# Patient Record
Sex: FEMALE | Race: WHITE | Employment: OTHER | URBAN - METROPOLITAN AREA
[De-identification: names, ages, dates, MRNs, and addresses within clinical notes are randomized per-mention and may not be internally consistent; named-entity substitution may affect disease eponyms.]

---

## 2018-01-29 ENCOUNTER — APPOINTMENT (OUTPATIENT)
Dept: GENERAL RADIOLOGY | Facility: HOSPITAL | Age: 83
DRG: 292 | End: 2018-01-29
Attending: EMERGENCY MEDICINE
Payer: MEDICARE

## 2018-01-29 ENCOUNTER — APPOINTMENT (OUTPATIENT)
Dept: CT IMAGING | Facility: HOSPITAL | Age: 83
DRG: 292 | End: 2018-01-29
Attending: EMERGENCY MEDICINE
Payer: MEDICARE

## 2018-01-29 ENCOUNTER — APPOINTMENT (OUTPATIENT)
Dept: GENERAL RADIOLOGY | Facility: HOSPITAL | Age: 83
DRG: 292 | End: 2018-01-29
Attending: NURSE PRACTITIONER
Payer: MEDICARE

## 2018-01-29 ENCOUNTER — APPOINTMENT (OUTPATIENT)
Dept: CV DIAGNOSTICS | Facility: HOSPITAL | Age: 83
DRG: 292 | End: 2018-01-29
Attending: INTERNAL MEDICINE
Payer: MEDICARE

## 2018-01-29 ENCOUNTER — HOSPITAL ENCOUNTER (INPATIENT)
Facility: HOSPITAL | Age: 83
LOS: 2 days | Discharge: NURSING, REHAB OR LONG-TERM CARE | DRG: 292 | End: 2018-01-31
Attending: EMERGENCY MEDICINE | Admitting: HOSPITALIST
Payer: MEDICARE

## 2018-01-29 ENCOUNTER — APPOINTMENT (OUTPATIENT)
Dept: ULTRASOUND IMAGING | Facility: HOSPITAL | Age: 83
DRG: 292 | End: 2018-01-29
Attending: EMERGENCY MEDICINE
Payer: MEDICARE

## 2018-01-29 DIAGNOSIS — I50.9 ACUTE ON CHRONIC CONGESTIVE HEART FAILURE, UNSPECIFIED CONGESTIVE HEART FAILURE TYPE: Primary | ICD-10-CM

## 2018-01-29 PROBLEM — I48.91 A-FIB (HCC): Status: ACTIVE | Noted: 2018-01-29

## 2018-01-29 PROBLEM — D50.9 IRON DEFICIENCY ANEMIA: Status: ACTIVE | Noted: 2018-01-29

## 2018-01-29 PROBLEM — G45.9 TIA (TRANSIENT ISCHEMIC ATTACK): Status: ACTIVE | Noted: 2018-01-29

## 2018-01-29 PROBLEM — I49.5 SSS (SICK SINUS SYNDROME) (HCC): Status: ACTIVE | Noted: 2018-01-29

## 2018-01-29 PROBLEM — I25.10 CORONARY ARTERY DISEASE INVOLVING NATIVE CORONARY ARTERY: Status: ACTIVE | Noted: 2018-01-29

## 2018-01-29 PROBLEM — J45.909 ASTHMA: Status: ACTIVE | Noted: 2018-01-29

## 2018-01-29 LAB
ANION GAP SERPL CALC-SCNC: 6 MMOL/L (ref 0–18)
BASOPHILS # BLD: 0 K/UL (ref 0–0.2)
BASOPHILS NFR BLD: 1 %
BNP SERPL-MCNC: 246 PG/ML (ref 0–100)
BUN SERPL-MCNC: 18 MG/DL (ref 8–20)
BUN/CREAT SERPL: 22.8 (ref 10–20)
CALCIUM SERPL-MCNC: 10 MG/DL (ref 8.5–10.5)
CHLORIDE SERPL-SCNC: 107 MMOL/L (ref 95–110)
CO2 SERPL-SCNC: 27 MMOL/L (ref 22–32)
CREAT SERPL-MCNC: 0.79 MG/DL (ref 0.5–1.5)
EOSINOPHIL # BLD: 0 K/UL (ref 0–0.7)
EOSINOPHIL NFR BLD: 1 %
ERYTHROCYTE [DISTWIDTH] IN BLOOD BY AUTOMATED COUNT: 15.2 % (ref 11–15)
GLUCOSE SERPL-MCNC: 92 MG/DL (ref 70–99)
HCT VFR BLD AUTO: 32.7 % (ref 35–48)
HGB BLD-MCNC: 10.5 G/DL (ref 12–16)
LYMPHOCYTES # BLD: 0.6 K/UL (ref 1–4)
LYMPHOCYTES NFR BLD: 7 %
MCH RBC QN AUTO: 31.4 PG (ref 27–32)
MCHC RBC AUTO-ENTMCNC: 32.1 G/DL (ref 32–37)
MCV RBC AUTO: 97.6 FL (ref 80–100)
MONOCYTES # BLD: 0.7 K/UL (ref 0–1)
MONOCYTES NFR BLD: 8 %
MRSA DNA SPEC QL NAA+PROBE: NEGATIVE
NEUTROPHILS # BLD AUTO: 7.3 K/UL (ref 1.8–7.7)
NEUTROPHILS NFR BLD: 85 %
OSMOLALITY UR CALC.SUM OF ELEC: 292 MOSM/KG (ref 275–295)
PLATELET # BLD AUTO: 421 K/UL (ref 140–400)
PMV BLD AUTO: 7.3 FL (ref 7.4–10.3)
POTASSIUM SERPL-SCNC: 3.8 MMOL/L (ref 3.3–5.1)
RBC # BLD AUTO: 3.35 M/UL (ref 3.7–5.4)
SODIUM SERPL-SCNC: 140 MMOL/L (ref 136–144)
TROPONIN I SERPL-MCNC: 0.01 NG/ML (ref ?–0.03)
TROPONIN I SERPL-MCNC: 0.01 NG/ML (ref ?–0.03)
WBC # BLD AUTO: 8.6 K/UL (ref 4–11)

## 2018-01-29 PROCEDURE — 80048 BASIC METABOLIC PNL TOTAL CA: CPT | Performed by: EMERGENCY MEDICINE

## 2018-01-29 PROCEDURE — 93970 EXTREMITY STUDY: CPT | Performed by: EMERGENCY MEDICINE

## 2018-01-29 PROCEDURE — 93010 ELECTROCARDIOGRAM REPORT: CPT | Performed by: EMERGENCY MEDICINE

## 2018-01-29 PROCEDURE — 71045 X-RAY EXAM CHEST 1 VIEW: CPT | Performed by: EMERGENCY MEDICINE

## 2018-01-29 PROCEDURE — 87641 MR-STAPH DNA AMP PROBE: CPT | Performed by: EMERGENCY MEDICINE

## 2018-01-29 PROCEDURE — 99285 EMERGENCY DEPT VISIT HI MDM: CPT

## 2018-01-29 PROCEDURE — 73630 X-RAY EXAM OF FOOT: CPT | Performed by: NURSE PRACTITIONER

## 2018-01-29 PROCEDURE — 85025 COMPLETE CBC W/AUTO DIFF WBC: CPT | Performed by: EMERGENCY MEDICINE

## 2018-01-29 PROCEDURE — 71260 CT THORAX DX C+: CPT | Performed by: EMERGENCY MEDICINE

## 2018-01-29 PROCEDURE — A4216 STERILE WATER/SALINE, 10 ML: HCPCS

## 2018-01-29 PROCEDURE — 83880 ASSAY OF NATRIURETIC PEPTIDE: CPT | Performed by: EMERGENCY MEDICINE

## 2018-01-29 PROCEDURE — 93005 ELECTROCARDIOGRAM TRACING: CPT

## 2018-01-29 PROCEDURE — 84484 ASSAY OF TROPONIN QUANT: CPT | Performed by: EMERGENCY MEDICINE

## 2018-01-29 PROCEDURE — 96374 THER/PROPH/DIAG INJ IV PUSH: CPT

## 2018-01-29 PROCEDURE — 84484 ASSAY OF TROPONIN QUANT: CPT | Performed by: NURSE PRACTITIONER

## 2018-01-29 RX ORDER — MELATONIN
325 2 TIMES DAILY WITH MEALS
Status: DISCONTINUED | OUTPATIENT
Start: 2018-01-29 | End: 2018-01-31

## 2018-01-29 RX ORDER — SODIUM PHOSPHATE, DIBASIC AND SODIUM PHOSPHATE, MONOBASIC 7; 19 G/133ML; G/133ML
1 ENEMA RECTAL ONCE AS NEEDED
Status: DISCONTINUED | OUTPATIENT
Start: 2018-01-29 | End: 2018-01-31

## 2018-01-29 RX ORDER — PANTOPRAZOLE SODIUM 40 MG/1
40 TABLET, DELAYED RELEASE ORAL
Status: DISCONTINUED | OUTPATIENT
Start: 2018-01-30 | End: 2018-01-31

## 2018-01-29 RX ORDER — SENNOSIDES 8.6 MG
8.6 TABLET ORAL 2 TIMES DAILY
Status: DISCONTINUED | OUTPATIENT
Start: 2018-01-29 | End: 2018-01-31

## 2018-01-29 RX ORDER — SULFAMETHOXAZOLE AND TRIMETHOPRIM 800; 160 MG/1; MG/1
1 TABLET ORAL EVERY 12 HOURS SCHEDULED
Status: DISCONTINUED | OUTPATIENT
Start: 2018-01-29 | End: 2018-01-31

## 2018-01-29 RX ORDER — SODIUM CHLORIDE 0.9 % (FLUSH) 0.9 %
3 SYRINGE (ML) INJECTION AS NEEDED
Status: DISCONTINUED | OUTPATIENT
Start: 2018-01-29 | End: 2018-01-31

## 2018-01-29 RX ORDER — FUROSEMIDE 10 MG/ML
20 INJECTION INTRAMUSCULAR; INTRAVENOUS ONCE
Status: COMPLETED | OUTPATIENT
Start: 2018-01-29 | End: 2018-01-29

## 2018-01-29 RX ORDER — FUROSEMIDE 10 MG/ML
20 INJECTION INTRAMUSCULAR; INTRAVENOUS
Status: DISCONTINUED | OUTPATIENT
Start: 2018-01-29 | End: 2018-01-31

## 2018-01-29 RX ORDER — 0.9 % SODIUM CHLORIDE 0.9 %
VIAL (ML) INJECTION
Status: DISPENSED
Start: 2018-01-29 | End: 2018-01-30

## 2018-01-29 RX ORDER — TRAMADOL HYDROCHLORIDE 50 MG/1
50 TABLET ORAL EVERY 6 HOURS PRN
Status: DISCONTINUED | OUTPATIENT
Start: 2018-01-29 | End: 2018-01-31

## 2018-01-29 RX ORDER — BISACODYL 10 MG
10 SUPPOSITORY, RECTAL RECTAL
Status: DISCONTINUED | OUTPATIENT
Start: 2018-01-29 | End: 2018-01-31

## 2018-01-29 RX ORDER — DOCUSATE SODIUM 100 MG/1
100 CAPSULE, LIQUID FILLED ORAL 2 TIMES DAILY
Status: DISCONTINUED | OUTPATIENT
Start: 2018-01-29 | End: 2018-01-31

## 2018-01-29 RX ORDER — ACETAMINOPHEN 325 MG/1
650 TABLET ORAL EVERY 6 HOURS PRN
Status: DISCONTINUED | OUTPATIENT
Start: 2018-01-29 | End: 2018-01-31

## 2018-01-29 RX ORDER — METOPROLOL SUCCINATE 50 MG/1
50 TABLET, EXTENDED RELEASE ORAL
Status: DISCONTINUED | OUTPATIENT
Start: 2018-01-30 | End: 2018-01-31

## 2018-01-29 RX ORDER — POLYETHYLENE GLYCOL 3350 17 G/17G
17 POWDER, FOR SOLUTION ORAL DAILY PRN
Status: DISCONTINUED | OUTPATIENT
Start: 2018-01-29 | End: 2018-01-31

## 2018-01-29 RX ORDER — ALBUTEROL SULFATE 2.5 MG/3ML
2.5 SOLUTION RESPIRATORY (INHALATION) EVERY 6 HOURS PRN
Status: DISCONTINUED | OUTPATIENT
Start: 2018-01-29 | End: 2018-01-31

## 2018-01-29 RX ORDER — SULFAMETHOXAZOLE AND TRIMETHOPRIM 400; 80 MG/1; MG/1
1 TABLET ORAL EVERY 12 HOURS SCHEDULED
Status: DISCONTINUED | OUTPATIENT
Start: 2018-01-29 | End: 2018-01-29

## 2018-01-29 NOTE — ED INITIAL ASSESSMENT (HPI)
Pt sent here via EMS from [facility] for eval of sob. Per EMS vitals stable pta. Pt states she feels sob with activity and \"when talking\". Vitals stable on arrival to ED. Denies cp or sob at this time.

## 2018-01-29 NOTE — H&P
[facility] Hospitalist Team  History and Physical     ASSESSMENT / PLAN:   81 yo female with hx CAD S/P stents, PAF, SSS S/P PPM, HTN, elevated calcium with ?  Hx hyperparathyroidism, TIA, asthma with recent fall and femur fx S/P ORIF and Nailing Left Femur by  Hyperparathyroidism  -Ca WNL at our hospital  -follow Ca level    HH-Large  -noted on imaging  -will give PPI in case symptoms from this    [address]  -per sister Sophie Fraction pt is [phone]   -consider palliative care krystal jacques in am  -IVF,none  - phone      OBJECTIVE:  /64   Pulse 64   Temp 98.7 °F (37.1 °C) (Temporal)   Resp 20   Ht 149.9 cm (4' 11\")   Wt 115 lb (52.2 kg)   SpO2 95%   BMI 23.23 kg/m²     GENERAL: no apparent distress  NEUROLOGIC: A/A; Ox3: strength normal; sensations intact AMYLASE, LIPASE, LDH in the last 72 hours. Invalid input(s): ALPHOS, TBIL, DBIL, TPROT    Recent Labs   Lab  01/29/18   1225   TROP  0.01       Radiology: Us Venous Doppler Leg Bilat - Diag Img (cpt=93970)    Result Date: 1/29/2018  CONCLUSION:  1.  Leandrew Overall b/l toes slightly cool to touch  Psych: mood stable, cooperative  Neuro: no focal deficits    Assessment and Plan     SOB/CP-- possibly mild diastolic HF vs. Atelectasis  -no hypoxemia or tachycardia.    -echo 1/8/2018 in [facility] with EF 57%, mild-m

## 2018-01-29 NOTE — ED PROVIDER NOTES
Patient Seen in: [facility] AND [facility] Emergency Department    History   Patient presents with:  Breathing Problem    Stated Complaint: sob    HPI    68-year-old female who follows previously Narayan Rinaldi is currently [facility] with history of A. fib as well as 11\")   Wt 52.2 kg   SpO2 97%   BMI 23.23 kg/m²         Physical Exam    Constitutional: Oriented to person, place, and time. Appears well-developed. No distress. Head: Normocephalic and atraumatic.    Eyes: Conjunctivae are normal. Pupils are equal, rou ------                     CBC W/ DIFFERENTIAL[[id number]]          Abnormal            Final result                 Please view results for these tests on the individual orders.    RAINBOW DRAW BLUE   RAINBOW DRAW LAVENDER   RAINBOW DRAW DARK GREEN   RA PORTABLE (CPT=71010) TIME: 11:18 a.m. COMPARISON: None. INDICATIONS: Shortness of breath x2 days. TECHNIQUE:   Single view.    FINDINGS:  CARDIAC/VASC: Stable position of a left chest bipolar pacing device/ICD, with electrodes overlying the right atrium atelectasis and/or scar is present around the effusions. VASCULATURE: Main pulmonary artery is not enlarged. No acute pulmonary embolus to the segmental pulmonary artery level.  There is a left-sided three-vessel aortic arch without aneurysm or aortic injur failure type ([facility] I50.9 1/29/2018     A-fib ([facility] I48.91 1/29/2018 Unknown    Acute on chronic congestive heart failure ([facility] I50.9 1/29/2018 Unknown    Asthma J45.909 1/29/2018 Unknown    Coronary artery disease involving native coronary artery I25.10 1/

## 2018-01-30 ENCOUNTER — APPOINTMENT (OUTPATIENT)
Dept: CV DIAGNOSTICS | Facility: HOSPITAL | Age: 83
DRG: 292 | End: 2018-01-30
Attending: INTERNAL MEDICINE
Payer: MEDICARE

## 2018-01-30 PROBLEM — I50.9 ACUTE ON CHRONIC CONGESTIVE HEART FAILURE, UNSPECIFIED CONGESTIVE HEART FAILURE TYPE: Status: RESOLVED | Noted: 2018-01-29 | Resolved: 2018-01-30

## 2018-01-30 LAB
ANION GAP SERPL CALC-SCNC: 8 MMOL/L (ref 0–18)
BASOPHILS # BLD: 0 K/UL (ref 0–0.2)
BASOPHILS NFR BLD: 1 %
BUN SERPL-MCNC: 16 MG/DL (ref 8–20)
BUN/CREAT SERPL: 22.5 (ref 10–20)
CALCIUM SERPL-MCNC: 10.1 MG/DL (ref 8.5–10.5)
CHLORIDE SERPL-SCNC: 106 MMOL/L (ref 95–110)
CO2 SERPL-SCNC: 25 MMOL/L (ref 22–32)
CREAT SERPL-MCNC: 0.71 MG/DL (ref 0.5–1.5)
EOSINOPHIL # BLD: 0.1 K/UL (ref 0–0.7)
EOSINOPHIL NFR BLD: 1 %
ERYTHROCYTE [DISTWIDTH] IN BLOOD BY AUTOMATED COUNT: 14.6 % (ref 11–15)
GLUCOSE SERPL-MCNC: 93 MG/DL (ref 70–99)
HCT VFR BLD AUTO: 31 % (ref 35–48)
HGB BLD-MCNC: 9.9 G/DL (ref 12–16)
LYMPHOCYTES # BLD: 0.8 K/UL (ref 1–4)
LYMPHOCYTES NFR BLD: 13 %
MCH RBC QN AUTO: 31.1 PG (ref 27–32)
MCHC RBC AUTO-ENTMCNC: 32 G/DL (ref 32–37)
MCV RBC AUTO: 97.3 FL (ref 80–100)
MONOCYTES # BLD: 0.7 K/UL (ref 0–1)
MONOCYTES NFR BLD: 10 %
NEUTROPHILS # BLD AUTO: 4.7 K/UL (ref 1.8–7.7)
NEUTROPHILS NFR BLD: 75 %
OSMOLALITY UR CALC.SUM OF ELEC: 289 MOSM/KG (ref 275–295)
PLATELET # BLD AUTO: 405 K/UL (ref 140–400)
PMV BLD AUTO: 7.3 FL (ref 7.4–10.3)
POTASSIUM SERPL-SCNC: 3.6 MMOL/L (ref 3.3–5.1)
POTASSIUM SERPL-SCNC: 4.4 MMOL/L (ref 3.3–5.1)
RBC # BLD AUTO: 3.19 M/UL (ref 3.7–5.4)
SODIUM SERPL-SCNC: 139 MMOL/L (ref 136–144)
WBC # BLD AUTO: 6.3 K/UL (ref 4–11)

## 2018-01-30 PROCEDURE — 80048 BASIC METABOLIC PNL TOTAL CA: CPT | Performed by: NURSE PRACTITIONER

## 2018-01-30 PROCEDURE — 93306 TTE W/DOPPLER COMPLETE: CPT | Performed by: INTERNAL MEDICINE

## 2018-01-30 PROCEDURE — 85025 COMPLETE CBC W/AUTO DIFF WBC: CPT | Performed by: NURSE PRACTITIONER

## 2018-01-30 PROCEDURE — 84132 ASSAY OF SERUM POTASSIUM: CPT | Performed by: HOSPITALIST

## 2018-01-30 PROCEDURE — 97530 THERAPEUTIC ACTIVITIES: CPT

## 2018-01-30 PROCEDURE — 97162 PT EVAL MOD COMPLEX 30 MIN: CPT

## 2018-01-30 PROCEDURE — 97166 OT EVAL MOD COMPLEX 45 MIN: CPT

## 2018-01-30 RX ORDER — TRAMADOL HYDROCHLORIDE 50 MG/1
50 TABLET ORAL EVERY 8 HOURS PRN
COMMUNITY

## 2018-01-30 RX ORDER — ALBUTEROL SULFATE 2.5 MG/3ML
SOLUTION RESPIRATORY (INHALATION)
COMMUNITY

## 2018-01-30 RX ORDER — AMOXICILLIN 250 MG
2 CAPSULE ORAL DAILY
COMMUNITY

## 2018-01-30 RX ORDER — MELATONIN
325
COMMUNITY

## 2018-01-30 RX ORDER — FLUTICASONE PROPIONATE 50 MCG
1 SPRAY, SUSPENSION (ML) NASAL DAILY
Status: DISCONTINUED | OUTPATIENT
Start: 2018-01-30 | End: 2018-01-31

## 2018-01-30 RX ORDER — PANTOPRAZOLE SODIUM 40 MG/1
40 TABLET, DELAYED RELEASE ORAL
Qty: 30 TABLET | Refills: 0 | Status: SHIPPED | OUTPATIENT
Start: 2018-01-31

## 2018-01-30 RX ORDER — POTASSIUM CHLORIDE 20 MEQ/1
40 TABLET, EXTENDED RELEASE ORAL EVERY 4 HOURS
Status: COMPLETED | OUTPATIENT
Start: 2018-01-30 | End: 2018-01-30

## 2018-01-30 RX ORDER — ACETAMINOPHEN 325 MG/1
325 TABLET ORAL EVERY 4 HOURS PRN
COMMUNITY

## 2018-01-30 RX ORDER — METOPROLOL SUCCINATE 50 MG/1
50 TABLET, EXTENDED RELEASE ORAL DAILY
COMMUNITY

## 2018-01-30 RX ORDER — SULFAMETHOXAZOLE AND TRIMETHOPRIM 800; 160 MG/1; MG/1
1 TABLET ORAL 2 TIMES DAILY
COMMUNITY
End: 2018-02-06 | Stop reason: ALTCHOICE

## 2018-01-30 NOTE — PROGRESS NOTES
[facility] Hospitalist Team  Progress Note    Caro Rasheed Patient Status:  Inpatient    1921 MRN [id number]   Location [facility] 5SW/SE Attending Laura Espino, DO   Hosp Day # 1 PCP None Pcp     CC: Follow Up  PCP: None Pcp    ASSESSMENT/PLAN: osteopenia, degenerative changes throughout the right foot\"- will get official read  -continue bactrim, started 1/27 at rehab center     S/P ORIF Left Femur with Nailing  -surgery done 1/8 at [facility] with Dr Robert Johnson  -admit hgb 10.5-->9.9, continue iron labored, CTA anteriorly  CARDIO: Regular,   ABD: soft, NT, ND, BS+  EXTREMITIES: improved right toe redness, no warmth, improved b/L LE swelling, severe ecchymosis on left leg, no calf tenderness,    DIAGNOSTIC DATA:   Labs:     Recent Labs   Lab  01/29/18 Intravenous BID (Diuretic)   Pantoprazole Sodium (PROTONIX) EC tab 40 mg 40 mg Oral QAM AC   Sulfamethoxazole-TMP DS (BACTRIM DS) 800-160 MG per tab 1 tablet 1 tablet Oral 2 times per day         IMAGING     Us Venous Doppler Leg Bilat - Diag Img (cpt=9347 extremity edema  Skin: b/l toes cool to touch, erythema distal R foot improved  Psych: mood stable, cooperative  Neuro: no focal deficits    Assessment and Plan    SOB/CP  -no hypoxemia or tachycardia.    -echo 1/8/2018 in [facility] with EF 57%, mild- cards  -continue toprol. No ASA with eliquis. No statin with age.   -as per cards     Hx TIA  -no residual     Elevated Calcium ?  Hx Hyperparathyroidism  -Ca WNL at our hospital  -follow Ca level     HH-Large  -noted on imaging  -will give PPI in case symp

## 2018-01-30 NOTE — PHYSICAL THERAPY NOTE
PT order received, chart reviewed and RN Roseline Cranker approved PT session. PT evaluation initiate but then transport arrived to take patient for ECHO so unable to complete PT evaluation. Will return in PM after testing to resume PT evaluation as schedule permits.

## 2018-01-30 NOTE — PROGRESS NOTES
[facility] Cardiology  Progress Note    Aj Sammy Patient Status:  Inpatient    1921 MRN [id number]   Location [facility] 5SW/SE Attending Heather Trinidad, DO   Hosp Day # 1 PCP None Pcp     Impression:  1.   Dyspnea, chest pain, (MIRALAX) powder packet 17 g 17 g Oral Daily PRN   magnesium hydroxide (MILK OF MAGNESIA) 400 MG/5ML suspension 30 mL 30 mL Oral Daily PRN   bisacodyl (DULCOLAX) rectal suppository 10 mg 10 mg Rectal Daily PRN   FLEET ENEMA (FLEET) 7-19 GM/118ML enema 133

## 2018-01-30 NOTE — CONSULTS
[facility]    PATIENT'S NAME: Dolores Blank   ATTENDING PHYSICIAN: Danilo Melo DO   CONSULTING PHYSICIAN: Robson Frazier MD   PATIENT ACCOUNT#:   [id number]    LOCATION:  [address] [place] [address] [address] #:   [id number]       DATE OF BIRTH: vision. She is hard of hearing. There is chest discomfort and shortness of breath. She denies any abdominal pain, melena, or hematuria. There is a large ecchymosis back of her left leg. She has had a recent femur fracture.   There is no history of diab discomfort, atypical.  3.   Acute on chronic probable diastolic dysfunction heart failure. 4.   Coronary artery disease status post stents, with chest pain that is atypical for coronary ischemia.   5.   Paroxysmal atrial fibrillation, currently atrial pace

## 2018-01-30 NOTE — DISCHARGE SUMMARY
[facility] Hospitalist Discharge Summary   Patient ID:  Sunny Moeller  [id number]  61 year old  4/1/1921    Admit date: 1/29/2018  Discharge date: 1/31/2018    Primary Care Physician: outside hospital  Attending Physician: Fernanda Escobedo DO   Consults:   Consult abdominal pain , N/V. Last BM 2 days ago. She has hip pain 3/10.  She has been having pain in her right foot especially at the right great toe nail bed.      Family friend at [facility] was able to provide some hs as well as sister [facility] via [facility] Course going to fall off from trauma.  Toes appear cool  -Xray foot-Age-indeterminate deformity of the distal phalanx of the great toe with adjacent soft tissue swelling, severe osteopenia, degenerative changes through right foot  -continue bactrim, started at [facility] 01/31/2018   CO2 25 01/31/2018   GLU 93 01/31/2018   CA 10.4 01/31/2018     Xr Foot, Complete (min 3 Views), Right (cpt=73630)    Result Date: 1/29/2018  CONCLUSION:   Age-indeterminate deformity of the distal phalanx of the great toe with adjacent soft ti MG/3ML) 0.083% Nebu  Commonly known as:  VENTOLIN     apixaban 2.5 MG Tabs  Commonly known as:  ELIQUIS     DAILY MULTIPLE VITAMINS/MIN Tabs     ferrous sulfate 325 (65 FE) MG Tbec     Metoprolol Succinate ER 50 MG Tb24  Commonly known as:   Toprol XL     S

## 2018-01-30 NOTE — PROGRESS NOTES
Bactrim (TMP/SMX) 400/80 mg PO q12h was ordered for Fam Little by Nilton Bird, KIP    Body mass index is 23.23 kg/m².   Wt Readings from Last 6 Encounters:  01/29/18 : 115 lb      Estimated Creatinine Clearance: 34.3 mL/min (based on SCr of 0.79 mg/dL

## 2018-01-30 NOTE — PLAN OF CARE
Problem: Patient/Family Goals  Goal: Patient/Family Long Term Goal  Patient's Long Term Goal: return to [facility]    Interventions:  - lasix and monitoring.   - See additional Care Plan goals for specific interventions   Outcome: Progressing    Goal: Patie

## 2018-01-30 NOTE — PLAN OF CARE
Problem: CARDIOVASCULAR - ADULT  Goal: Maintains optimal cardiac output and hemodynamic stability  INTERVENTIONS:  - Monitor vital signs, rhythm, and trends  - Monitor for bleeding, hypotension and signs of decreased cardiac output  - Evaluate effectivenes with patient/family  Outcome: Progressing  Seen by PT 2 max assist  Goal: Maintain proper alignment of affected body part  INTERVENTIONS:  - Support and protect limb and body alignment per provider's orders  - Instruct and reinforce with patient and family

## 2018-01-30 NOTE — OCCUPATIONAL THERAPY NOTE
OCCUPATIONAL THERAPY EVALUATION - INPATIENT     Room Number: 570/570-A  Evaluation Date: 1/30/2018  Type of Evaluation: Initial  Presenting Problem:  (C/O sob)    Physician Order: IP Consult to Occupational Therapy  Reason for Therapy: ADL/IADL Dysfunction [facility])    • Essential hypertension    • Hypercholesteremia    • Pacemaker    • Sick sinus syndrome (HCC)    • TIA (transient ischemic attack)    • Venous insufficiency        Past Surgical History  Past Surgical History:  No date: [address] [facility] Impairment: 53.32%  Standardized Score (AM-PAC Scale): 35.96  CMS Modifier (G-Code): CK    FUNCTIONAL TRANSFER ASSESSMENT  Supine to Sit : Moderate assistance  Sit to Stand: Maximum assistance  Toilet Transfer: max A  Shower Transfer: NT  Chair Transfer: m

## 2018-01-30 NOTE — CM/SW NOTE
SW was informed that pt is from [facility]    SW contacted Elodia Galloway, who is pt's caregiver, who confirmed that pt is from [facility] for rehab. Pt has been there for about 3 weeks per caregiver. Elodia Galloway did state the plan is for pt to return back to [facility] once stable.     Fern Bauer

## 2018-01-31 VITALS
OXYGEN SATURATION: 97 % | HEART RATE: 62 BPM | SYSTOLIC BLOOD PRESSURE: 106 MMHG | WEIGHT: 106.69 LBS | DIASTOLIC BLOOD PRESSURE: 46 MMHG | RESPIRATION RATE: 18 BRPM | TEMPERATURE: 98 F | HEIGHT: 59 IN | BODY MASS INDEX: 21.51 KG/M2

## 2018-01-31 LAB
ANION GAP SERPL CALC-SCNC: 6 MMOL/L (ref 0–18)
BASOPHILS # BLD: 0 K/UL (ref 0–0.2)
BASOPHILS NFR BLD: 0 %
BUN SERPL-MCNC: 15 MG/DL (ref 8–20)
BUN/CREAT SERPL: 22.1 (ref 10–20)
CALCIUM SERPL-MCNC: 10.4 MG/DL (ref 8.5–10.5)
CHLORIDE SERPL-SCNC: 107 MMOL/L (ref 95–110)
CO2 SERPL-SCNC: 25 MMOL/L (ref 22–32)
CREAT SERPL-MCNC: 0.68 MG/DL (ref 0.5–1.5)
EOSINOPHIL # BLD: 0.1 K/UL (ref 0–0.7)
EOSINOPHIL NFR BLD: 2 %
ERYTHROCYTE [DISTWIDTH] IN BLOOD BY AUTOMATED COUNT: 14.8 % (ref 11–15)
GLUCOSE SERPL-MCNC: 93 MG/DL (ref 70–99)
HCT VFR BLD AUTO: 31 % (ref 35–48)
HGB BLD-MCNC: 10.3 G/DL (ref 12–16)
LYMPHOCYTES # BLD: 0.8 K/UL (ref 1–4)
LYMPHOCYTES NFR BLD: 13 %
MCH RBC QN AUTO: 31.5 PG (ref 27–32)
MCHC RBC AUTO-ENTMCNC: 33.2 G/DL (ref 32–37)
MCV RBC AUTO: 95.1 FL (ref 80–100)
MONOCYTES # BLD: 0.6 K/UL (ref 0–1)
MONOCYTES NFR BLD: 10 %
NEUTROPHILS # BLD AUTO: 4.6 K/UL (ref 1.8–7.7)
NEUTROPHILS NFR BLD: 75 %
OSMOLALITY UR CALC.SUM OF ELEC: 287 MOSM/KG (ref 275–295)
PLATELET # BLD AUTO: 392 K/UL (ref 140–400)
PMV BLD AUTO: 7.1 FL (ref 7.4–10.3)
POTASSIUM SERPL-SCNC: 4.9 MMOL/L (ref 3.3–5.1)
RBC # BLD AUTO: 3.26 M/UL (ref 3.7–5.4)
SODIUM SERPL-SCNC: 138 MMOL/L (ref 136–144)
WBC # BLD AUTO: 6.1 K/UL (ref 4–11)

## 2018-01-31 PROCEDURE — 85025 COMPLETE CBC W/AUTO DIFF WBC: CPT | Performed by: NURSE PRACTITIONER

## 2018-01-31 PROCEDURE — 80048 BASIC METABOLIC PNL TOTAL CA: CPT | Performed by: NURSE PRACTITIONER

## 2018-01-31 PROCEDURE — 97110 THERAPEUTIC EXERCISES: CPT

## 2018-01-31 RX ORDER — FUROSEMIDE 20 MG/1
TABLET ORAL
Qty: 15 TABLET | Refills: 0 | Status: SHIPPED | OUTPATIENT
Start: 2018-01-31

## 2018-01-31 RX ORDER — GUAIFENESIN 600 MG
600 TABLET, EXTENDED RELEASE 12 HR ORAL 2 TIMES DAILY PRN
Qty: 1 TABLET | Refills: 0 | Status: SHIPPED | OUTPATIENT
Start: 2018-01-31

## 2018-01-31 RX ORDER — FUROSEMIDE 20 MG/1
20 TABLET ORAL DAILY
Status: DISCONTINUED | OUTPATIENT
Start: 2018-01-31 | End: 2018-01-31

## 2018-01-31 NOTE — PROGRESS NOTES
[facility] Cardiology  Progress Note    Alvera Shade Patient Status:  Inpatient    1921 MRN [id number]   Location [facility] 5SW/SE Attending Fernanda Escobedo, DO   Hosp Day # 2 PCP None Pcp     Impression:  1.   Dyspnea, chest pain, docusate sodium (COLACE) cap 100 mg 100 mg Oral BID   PEG 3350 (MIRALAX) powder packet 17 g 17 g Oral Daily PRN   magnesium hydroxide (MILK OF MAGNESIA) 400 MG/5ML suspension 30 mL 30 mL Oral Daily PRN   bisacodyl (DULCOLAX) rectal suppository 10 mg 10 m was 55-60%. Wall motion was normal; there     were no regional wall motion abnormalities. 2. Aortic valve: There was mild stenosis. Trivial regurgitation. Mean gradient:     10mm Hg (S). Valve area: 1.13cm^2(VTI). Valve area: 1.16cm^2 (Vmax).   3. Left atr

## 2018-01-31 NOTE — PHYSICAL THERAPY NOTE
PHYSICAL THERAPY TREATMENT NOTE - INPATIENT    Room Number: 570/570-A       Presenting Problem: SOB, R foot pain, BLE edema.  Was at rehab for ORIF L femur but brought to ER with SOB    Problem List  Active Problems:    Acute on chronic congestive heart fa Standing: Poor  Dynamic Standing: Poor -    ACTIVITY TOLERANCE  Room air    AM-PAC '6-Clicks' INPATIENT SHORT FORM - BASIC MOBILITY  How much difficulty does the patient currently have. ..  -   Turning over in bed (including adjusting bedclothes, sheets and #4   Current Status     Goal #5 Patient to demonstrate active participation with home activity/exercise instructions provided to patient in preparation for discharge.    Goal #5   Current Status  working towards

## 2018-01-31 NOTE — CM/SW NOTE
SW was informed that pt is able to d/c back to [facility] today    SW notified [facility]; agreeable w/ 12p d/c time. Clinical updates sent. RIKY notified RN; agreeable w/ d/c time  SW attempted to meet w/ pt - pt was asleep. SW left IM letter in room.   SW notified pt's

## 2018-01-31 NOTE — PLAN OF CARE
CARDIOVASCULAR - ADULT    • Maintains optimal cardiac output and hemodynamic stability Completed        MUSCULOSKELETAL - ADULT    • Return mobility to safest level of function Completed    • Maintain proper alignment of affected body part Completed

## 2018-02-04 NOTE — PROGRESS NOTES
[address] [facility] Patient Status:  Outpatient    1921 MRN [id number]   Location [address] None Pcp         Eddie Doris is a 80year old female who presents to clinic for acute on chronic benito 01/29/2018 03:27 PM       Clinical labs drawn at Rehab facility, BMP, CBC.  Results reviewed    BP (!) 89/48   Pulse 62   SpO2 98%     Hospital D/C wt: 106  Clinic weights: 1) W/C bound  Home Wt:  Rehab wt: 105    General appearance: alert, appears stated a auscultation. Trace -1+ LE edema to left leg, trace LE edema to right. Patient to follow up with her PCP at [facility] and Cardiologist, Dr. Hermelinda Lr.     Blood work from 2/3 BMP stable- BUN/Cr 26/0.9 , K 5.1, CBC 2/1 WBC 7.9, Hg 10.4  Slightly hypoten (not homemade), some cereal, vegetable juice, canned vegetables, lunch meats, processed meats like hotdogs, sausage, santos, pepperoni, soy sauce, pre-packaged rice or potatoes. Please remember to read nutrition labels for sodium content.      · Limit caffei

## 2018-02-06 ENCOUNTER — OFFICE VISIT (OUTPATIENT)
Dept: CARDIOLOGY CLINIC | Facility: HOSPITAL | Age: 83
End: 2018-02-06
Attending: INTERNAL MEDICINE
Payer: MEDICARE

## 2018-02-06 VITALS — SYSTOLIC BLOOD PRESSURE: 89 MMHG | DIASTOLIC BLOOD PRESSURE: 48 MMHG | HEART RATE: 62 BPM | OXYGEN SATURATION: 98 %

## 2018-02-06 DIAGNOSIS — I50.33 ACUTE ON CHRONIC DIASTOLIC CONGESTIVE HEART FAILURE (HCC): Primary | ICD-10-CM

## 2018-02-06 PROCEDURE — 36415 COLL VENOUS BLD VENIPUNCTURE: CPT | Performed by: NURSE PRACTITIONER

## 2018-02-06 PROCEDURE — 99214 OFFICE O/P EST MOD 30 MIN: CPT | Performed by: NURSE PRACTITIONER

## 2018-02-06 PROCEDURE — 99211 OFF/OP EST MAY X REQ PHY/QHP: CPT | Performed by: NURSE PRACTITIONER

## 2018-02-06 NOTE — PATIENT INSTRUCTIONS
Increase Furosemide to 20 mg daily until Sunday, then return to Furosemide 20 mg Monday, Wednesday and Friday    Return to [address] February 20, 2018    Please call the heart failure clinic if you notice a weight gain of 3 pounds overnight or 5 fatigue.  Stop exercise if you develop chest pain, lightheadedness, or significant shortness of breath

## 2018-02-22 ENCOUNTER — LAB REQUISITION (OUTPATIENT)
Dept: LAB | Facility: HOSPITAL | Age: 83
End: 2018-02-22
Payer: MEDICARE

## 2018-02-22 DIAGNOSIS — S72.145D CLOSED NONDISPLACED INTERTROCHANTERIC FRACTURE OF LEFT FEMUR WITH ROUTINE HEALING: ICD-10-CM

## 2018-02-22 DIAGNOSIS — I48.91 ATRIAL FIBRILLATION (HCC): ICD-10-CM

## 2018-02-22 LAB
ALBUMIN SERPL BCP-MCNC: 3 G/DL (ref 3.5–4.8)
ALBUMIN/GLOB SERPL: 0.9 {RATIO} (ref 1–2)
ALP SERPL-CCNC: 108 U/L (ref 32–100)
ALT SERPL-CCNC: 12 U/L (ref 14–54)
ANION GAP SERPL CALC-SCNC: 9 MMOL/L (ref 0–18)
AST SERPL-CCNC: 20 U/L (ref 15–41)
BILIRUB SERPL-MCNC: 0.7 MG/DL (ref 0.3–1.2)
BUN SERPL-MCNC: 17 MG/DL (ref 8–20)
BUN/CREAT SERPL: 28.8 (ref 10–20)
CALCIUM SERPL-MCNC: 11 MG/DL (ref 8.5–10.5)
CHLORIDE SERPL-SCNC: 107 MMOL/L (ref 95–110)
CO2 SERPL-SCNC: 26 MMOL/L (ref 22–32)
CREAT SERPL-MCNC: 0.59 MG/DL (ref 0.5–1.5)
ERYTHROCYTE [DISTWIDTH] IN BLOOD BY AUTOMATED COUNT: 14.7 % (ref 11–15)
GLOBULIN PLAS-MCNC: 3.4 G/DL (ref 2.5–3.7)
GLUCOSE SERPL-MCNC: 95 MG/DL (ref 70–99)
HCT VFR BLD AUTO: 36 % (ref 35–48)
HGB BLD-MCNC: 11.9 G/DL (ref 12–16)
MCH RBC QN AUTO: 31.1 PG (ref 27–32)
MCHC RBC AUTO-ENTMCNC: 33.1 G/DL (ref 32–37)
MCV RBC AUTO: 94 FL (ref 80–100)
OSMOLALITY UR CALC.SUM OF ELEC: 295 MOSM/KG (ref 275–295)
PLATELET # BLD AUTO: 359 K/UL (ref 140–400)
PMV BLD AUTO: 7.7 FL (ref 7.4–10.3)
POTASSIUM SERPL-SCNC: 3.4 MMOL/L (ref 3.3–5.1)
PROT SERPL-MCNC: 6.4 G/DL (ref 5.9–8.4)
RBC # BLD AUTO: 3.83 M/UL (ref 3.7–5.4)
SODIUM SERPL-SCNC: 142 MMOL/L (ref 136–144)
WBC # BLD AUTO: 8.7 K/UL (ref 4–11)

## 2018-02-22 PROCEDURE — 80053 COMPREHEN METABOLIC PANEL: CPT | Performed by: INTERNAL MEDICINE

## 2018-02-22 PROCEDURE — 85027 COMPLETE CBC AUTOMATED: CPT | Performed by: INTERNAL MEDICINE

## 2021-06-16 NOTE — PHYSICAL THERAPY NOTE
Cardiac Catheterization Appropriate Use    [facility] Clinical Frailty Scale     [] 1. Very Fit  [] 2. Well  [] 3. Managing Well  [x] 4. Vulnerable  [] 5. Mildly Frail  [] 6. Moderately Frail [] 7. Severely Frail  [] 8. Very Severely Frail  [] 9. Terminally III        Heart Failure Yes: NYHA Class III: Significant HF symptoms/activity intolerance (Only able to do light housework, can walk slowly on level ground)         If yes, newly diagnosed? [] Yes       [x] No  (if yes, specify type)         If yes, heart failure type? [] Systolic [x] Diastolic   [] Unknown         Stress Test Performed [] Yes       [x] No  (if yes, specify type, result, and risk of ischemia)    Type Result Risk of Ischemia   [] Exercise EKG (No Imaging)  [] Stress Echo  [] Stress Nuclear   [] Stress CMR    [] Positive                   [] Negative  [] Indeterminate  [] Unavailable [] High   [] Intermediate  [] Low  [] Unavailable        Cardiac CTA Performed        Cardiac CTA Findings:    Indication(s) for Cath Lab Visit  [] Yes   [x] No  (if yes, specify findings)  [] 3VD  [] 2VD   [] 1VD   [] No Disease        [] Indeterminant      [x] ACS <24 hours    [] ACS >24 hours  [x] New Onset (Unstable) Angina       (<2 Months)  [x] Worsening (Unstable) Angina  [] Resuscitated Cardiac Arrest [x] Known CAD  [] Suspected CAD  [] Valvular Disease  [] Pericardial Disease  [] Pre-Operative Evaluation  [] Syncope [] Post-Cardiac Transplant  [] Cardiac Arrhythmias   [x] Cardiomyopathy  [x] LV dysfunction  [] Evaluation for Exercise Clearance  [] Other (specify)     Chest Pain Symptoms     [x] Typical Angina   (or Anginal Equivalent)                  [] Atypical Angina  [] Non-Anginal Chest Pain   [] None/Asymptomatic      Cardiovascular Instability   [] Persistent Ischemic Symptoms       (Angina or Anginal Equivalent)  [] Hemodynamic Instability       (Not Cardiogenic Shock)    [] Yes   [x] No     (if yes, specify below)  [] Cardiogenic Shock  [] Refractory  PHYSICAL THERAPY EVALUATION - INPATIENT     Room Number: 570/570-A  Evaluation Date: 1/30/2018  Type of Evaluation: Initial   Physician Order: PT Eval and Treat    Presenting Problem: SOB, R foot pain, BLE edema.  Was at rehab for ORIF L femur but angie Cardiogenic Shock     [] Acute Heart Failure Symptoms  [] Ventricular Arrhythmia   Evaluation for Surgery       Type of Surgery:              Functional Capacity: [] Yes   [x] No  (if yes, specify below)  [] Cardiac Surgery    [] Unknown  [] <4 METs     [] Non-Cardiac Surgery    [] >=4 METs with symptoms    [] >=4 METs without symptoms           Surgical Risk: [] Low  [] Intermediate [] High Risk Vascular  [] High Risk Non-Vascular          Solid Organ Transplant?            If Yes, Donor?                          If Yes, Organ? [] Yes  [] No  (if yes, specify if donor)  [] Yes  [] No  (if yes, specify organ)    [] Heart  [] Kidney  [] Liver        [] Lung  [] Pancreas  [] Other (specify)        Problem List  Active Problems:    Acute on chronic congestive heart failure (HCC)    A-fib (HCC)    SSS (sick sinus syndrome) (HCC)    Coronary artery disease involving native coronary artery    Iron deficiency anemia    TIA (transient ischemic attack) extremity strength is decreased. She is very weak.      BALANCE  Static Sitting: Fair  Dynamic Sitting: Fair -  Static Standing: Poor  Dynamic Standing: Poor -    ADDITIONAL TESTS                                    NEUROLOGICAL FINDINGS MIN A     Goal #1   Current Status    Goal #2 Patient is able to demonstrate transfers MOD A from chair and bed into RW. WBAT LLE.       Goal #2  Current Status    Goal #3 Patient is able to ambulate 10[' with RW WBAT LLE with MIN A   Goal #3   Current Stat

## (undated) NOTE — IP AVS SNAPSHOT
[facility]            (For Outpatient Use Only) Initial Admit Date: 1/29/2018   Inpt/Obs Admit Date: Inpt: 1/29/18 / Obs: N/A   Discharge Date:    Smitha Echevarria:  [de-identified]   MRN: [de-identified]   CSN: [id number]        ENCOUNTER  Patient Class Subscriber Name:  Miguel Zeina :    Subscriber ID:  Pt Rel to Subscriber:    Hospital Account Financial Class: Medicare    2018

## (undated) NOTE — IP AVS SNAPSHOT
Patient Demographics     Address  [address] [place] [address] Phone  [phone] (Home) *Preferred*      Emergency Contact(s)     Name Relation Home Work [facility]   [phone]    [facility]   [phone]    [facility] furosemide 20 MG Tabs  Commonly known as:  LASIX  Next dose due:  Friday 2/2/18      Every Monday, Wednesday and Friday   Tammy Crawley NP         GuaiFENesin  MG Tb12  Commonly known as:  MUCINEX  Next dose due:  As needed      Take 1 tablet (600 mg [id number] Senna (SENOKOT) tab TABS 8.6 mg 01/30/18 2030 Given      [id number] Senna (SENOKOT) tab TABS 8.6 mg 01/31/18 1030 Given      [id number] Sulfamethoxazole-TMP DS (BACTRIM DS) 800-160 MG per tab 1 tablet 01/30/18 2030 Given      [id number] Sulfamethox BUN/CREA Ratio 22.1 10.0 - 20.0 [facility]   Anion Gap 6 0 - 18 mmol/L — [facility]   Calculated Osmolality 287 275 - 295 mOsm/kg — [facility]   GFR, Non-African American >60 >=60 — [facility]   GFR, African-American >60 >=60 [facility] 01/29/18 1350    Order Status:  Completed Lab Status:  Final result Updated:  01/29/18 1504    Specimen:  Other from [place]      MRSA Screen By PCR Negative         H&P - H&P Note      H&P signed by Nathalia Ocampo NP at 1/29/2018  3:44 PM  Version 4 of 4 -surgery done 1/8[GB. 1] at [facility] with Dr Lipscomb[GB.2]  -[GB. 1]admit hgb 10.5,[GB. 2] continue iron for post op anemia  -prn tramadol  -DVT prophylaxis- eliquis  -PT/OT/SW-wt bearing as tolerated  -follow up with Dr Lito Verma  -prn albuterol    P [facility] on 1/8, post op coarse complicated by anemia and NSVT. She has since been doing rehab at [facility]. She has noted SOB with exertion, some mild chest pain. She has had LE swelling.  She was recently started on bactrim for possible cellulitis of righ No outpatient prescriptions have been marked as taking for the 1/29/18 encounter [facility] HOSPITAL Encounter). Soc Hx     Smoking status: Former Smoker     Smokeless tobacco: Never Used    Alcohol use Not on file        Fam Hx  No family history on file.     Rev mornings. Denies similar symptoms in the past, no other complaints noted, no fever/chills or other URI type symptoms. objective[LA. 1]  /51   Pulse 64   Temp 98.7 °F (37.1 °C) (Temporal)   Resp 20   Ht 149.9 cm (4' 11\")   Wt 115 lb (52.2 kg)   Sp -EKG with SR. Tele monitoring  -continue toprol. No ASA with eliquis. No statin with age.   -saw Dr Roxanna Brantley at [facility]  -as per cards     Hx TIA  -no residual     Elevated Calcium ?  Hx Hyperparathyroidism  -Ca WNL at our hospital  -follow Ca level    -CXR with small bibasilar pleural effusions with B/L pul opacities suggesting atelectasis[GB.1].[GB.2]   -US legs negative for DVT. CT chest R/O PE pending  -lasix 20 mg IV BID[GB.1]. Follow daily wts, I/O and Cr. PAWEL[GB.2]  -continue toprol.  No ASA calcium with ? Hx hyperparathyroidism, TIA, asthma with recent fall and femur fx S/P ORIF and Nailing Left Femur by Dr Otoniel Greer with hospital coarse complicated by post op anemia.  Pt has been doing rehab at [facility] and now presents with SOB with exertion, • A-fib [facility])    • Anemia    • Arrhythmia    • Asthma    • Cardiomyopathy [facility])    • Essential hypertension    • Hypercholesteremia    • Pacemaker    • Sick sinus syndrome (HCC)    • TIA (transient ischemic attack)    • Venous insufficiency         PSH  Hi HPI : Pt is a 79 yo F with a history of CAD s/p stents, PAF, SSS s/p PPM,   HTN, hypoparathyroidism, TIA, asthma, recent fall with femur fracture s/p ORIF by Dr. Otoniel Greer at [facility] who presents from [address] with a c/co of sob on exertion.  Pt states she -admit hgb 10.5, continue iron for post op anemia  -prn tramadol  -DVT prophylaxis- eliquis  -PT/OT/SW-wt bearing as tolerated  -follow up with Dr Veronika Hastings     Asthma  -prn albuterol     PAF/SSS S/P PPM/NSVT/CAD S/P Stents  -echo 1/8/2018 in [facility] with recent dx of cellulitis, see below for details    SOB/CP  -no hypoxemia or tachycardia. -echo 1/8/2018 in [facility] with EF 57%, mild-mod LVH, mild-moderate pul HTN with RSVP 45.   -troponin negative-->repeat pending.  EKG with noted old infarc Pager [phone]  Answering Service number: [phone]  1/29/2018      HISTORY:   CC: Patient presents with:  Breathing Problem       PCP: None Pcp-aide CHAVARRIA    History of Present Illness:    81 yo female with hx CAD S/P stents, PAF, SSS S/P PPM, HTN EXTREMITIES: left leg with swelling and ecchymotic areas, right leg with swelling, noted right toes with redness, right great toe with pain on palpation, no edema; no cyanosis;, no calf tenderness; peripheral pulses intact       PMH  Past Medical History: CONCLUSION:  1. Large hiatal hernia. 2.  Small bibasilar pleural effusions. Bibasilar pulmonary opacities suggesting atelectasis. 3.  A cardiac pacing device is present.           SEE ATTENDING NOTE BELOW[GB.1]          Electronically signed by [employer] -continue iron for post op anemia  -prn tramadol  -DVT prophylaxis- eliquis  -PT/OT/SW-wt bearing as tolerated  -follow up with Dr Dania Lebron  -prn albuterol    PAF/SSS S/P PPM/NSVT/CAD S/P Stents  -echo 1/8/2018 in [facility] with EF 57%, mild since been doing rehab at [facility]. She has noted SOB with exertion, some mild chest pain. She has had LE swelling. She was recently started on bactrim for possible cellulitis of right foot with right great toe pain.  She has hx PAF, previous cardiac stent encounter [facility] HOSPITAL Encounter). Soc Hx     Smoking status: Former Smoker     Smokeless tobacco: Never Used    Alcohol use Not on file        Fam Hx  No family history on file.     Review of Systems  A comprehensive 10 point review of systems was complete ATTENDING PHYSICIAN: Alycia Murcia DO   CONSULTING PHYSICIAN: Robson Hayden MD   PATIENT ACCOUNT#:   [id number]    LOCATION:  [address] [place] [address] #:   [id number]       YOB: 1921  ADMISSION DATE:       01/29/2018      CONSU hard of hearing. There is chest discomfort and shortness of breath. She denies any abdominal pain, melena, or hematuria. There is a large ecchymosis back of her left leg. She has had a recent femur fracture. There is no history of diabetes.   There is 2.   Chest discomfort, atypical.  3.   Acute on chronic probable diastolic dysfunction heart failure. 4.   Coronary artery disease status post stents, with chest pain that is atypical for coronary ischemia.   5.   Paroxysmal atrial fibrillation, currently ---- Lucy Winters 89 04/01/1921 H: [id number]                                            E: [id number] Study date: Jan 30 2018 9:44AM                           Room: 570 Accession: [id number] HT:59in WT:108lb completion:  The patient tolerated the procedure well. There were no complications. Transthoracic echocardiography. M-mode, complete 2D, complete spectral Doppler, and color Doppler. Patient YOB: 1921. Patient is 89yr old. Gender: female. with normal central venous pressure. ------------------------------------------------------------------------------- 2D measurements                                               Normal Left ventricle LV internal dimension, ED, chordal level, PLAX *35.4 mm 5.85 cm/s     ------- E/Ea, medial annulus, tissue Doppler           24.62          ------- Aortic valve Peak velocity, S                                 236 cm/s     ------- Mean velocity, S                                 152 cm/s     ------- VTI, S Physical Therapy Note signed by Kendall Dennis PTA at 1/31/2018 10:23 AM  Version 1 of 1    Author:  Kendall Dennis PTA Service:  (none) Author Type:  Physical Therapist    Filed:  1/31/2018 10:23 AM Date of Service:  1/31/2018 10:14 AM Status:  Signed L Lower Extremity: Weight Bearing as Tolerated    PAIN ASSESSMENT   Rating: 3  Location: L LE  Management Techniques:  Activity promotion;Repositioning    BALANCE Goal #1 Patient is able to demonstrate supine - sit EOB @ level: MIN A   Goal #1   Current Status  not met - Max A x 1-2   Goal #2 Patient is able to demonstrate transfers MOD A from chair and bed into RW. WBAT LLE.     Goal #2  Current Status  not met - Ma and toilet. She required MAX A for hygiene care and to don/doff Depends while toileting. She was able to take 4 very small steps x 2 with MAX A on/off toilet and into bedside chair using RW.  Her activity tolerance is poor but vitals are stable on room air Patient Owned Equipment: Rolling walker  Patient Regularly Uses: Hearing aides    Prior Level of [place]: taking a few steps with staff at [facility] with [address] [place] [address]. Needed assist for ADLs. Prior to [facility] she lived alone in [facility] and was ind with ADLs. -   Need to walk in hospital room?: A Lot   -   Climbing 3-5 steps with a railing?: Total     AM-PAC Score:  Raw Score: 11   PT Approx Degree of Impairment Score: 72.57%   Standardized Score (AM-PAC Scale): 33.86   CMS Modifier (G-Code): CL    FUNCTIONAL A :  Floresita Tee, PT (Physical Therapist)       PT order received, chart reviewed and KENYON Hall approved PT session. PT evaluation initiate but then transport arrived to take patient for ECHO so unable to complete PT evaluation.  Will return in PM a with standing/mobility tasks. Recommend continued skilled therapy while hospitalized. Pt will require 24 hour care and continued skilled therapy at discharge.      DISCHARGE RECOMMENDATIONS  OT Discharge Recommendations: Sub-acute rehabilitation       PLAN Fair, fatigues quickly    COGNITION   A and O to person and place    Behavioral/Emotional/Social: pleasant and cooperative though appears anxious at times during mobility tasks    RANGE OF MOTION   Upper extremity ROM is within functional limits     [facility] SF.1 - Talisha Huerta OT on 1/30/2018  1:57 PM                     Video Swallow Study Notes     No notes of this type exist for this encounter. SLP Notes     No notes of this type exist for this encounter.             Future Appointments        Pr